# Patient Record
Sex: FEMALE | URBAN - METROPOLITAN AREA
[De-identification: names, ages, dates, MRNs, and addresses within clinical notes are randomized per-mention and may not be internally consistent; named-entity substitution may affect disease eponyms.]

---

## 2023-05-04 ENCOUNTER — OFFICE VISIT (OUTPATIENT)
Facility: LOCATION | Age: 53
End: 2023-05-04
Payer: COMMERCIAL

## 2023-05-04 DIAGNOSIS — H40.013 OPEN ANGLE WITH BORDERLINE FINDINGS, LOW RISK, BILATERAL: Primary | ICD-10-CM

## 2023-05-04 DIAGNOSIS — H04.123 DRY EYE SYNDROME OF BILATERAL LACRIMAL GLANDS: ICD-10-CM

## 2023-05-04 PROCEDURE — 92020 GONIOSCOPY: CPT | Performed by: OPHTHALMOLOGY

## 2023-05-04 PROCEDURE — 99214 OFFICE O/P EST MOD 30 MIN: CPT | Performed by: OPHTHALMOLOGY

## 2023-05-04 PROCEDURE — 92133 CPTRZD OPH DX IMG PST SGM ON: CPT | Performed by: OPHTHALMOLOGY

## 2023-05-04 ASSESSMENT — INTRAOCULAR PRESSURE
OD: 17
OS: 17
OD: 19
OS: 16

## 2023-05-04 NOTE — IMPRESSION/PLAN
Impression: Examination revealed dry eye syndrome secondary to tear deficiencies. (+) Sjogren's Dx 03/2022. H/o Extended duration punctal plugs. Plan: Today:
Discussed with patient blurry VA due to dryness. Informed patient likely to chronic dryness due to Sjogren's. Plan: 
Continue ATs QID OU.

## 2023-05-25 ENCOUNTER — PROCEDURE (OUTPATIENT)
Facility: LOCATION | Age: 53
End: 2023-05-25
Payer: COMMERCIAL

## 2023-05-25 DIAGNOSIS — H40.012 OPEN ANGLE WITH BORDERLINE FINDINGS, LOW RISK, LEFT EYE: ICD-10-CM

## 2023-05-25 DIAGNOSIS — H40.1111 PRIMARY OPEN-ANGLE GLAUCOMA, RIGHT EYE, MILD STAGE: Primary | ICD-10-CM

## 2023-05-25 PROCEDURE — 65855 TRABECULOPLASTY LASER SURG: CPT | Performed by: OPHTHALMOLOGY

## 2023-05-25 ASSESSMENT — INTRAOCULAR PRESSURE
OD: 19
OS: 17

## 2023-05-25 NOTE — IMPRESSION/PLAN
Impression: Patient presents today for SLT OD. POHx: POAG mild OD, GS OS, JENY 2/2 (+) Sjogren's, (-) Ocular/head trauma. FOHx: Positive to glaucoma (Sister). PMHx: HDL, Thyroid problems, Allergies. Eye meds: ATs QID OU. TMAX: Not established. Target IOP: Not established. Plan: Testing:
OCT/ONH 05/2023: OD bdl thin IT, normal GCC, OS bdl thin T, thin IN, normal GCC. OD possible progression, OS stable. Pachy: 502/501. Gonio 5/2023: SS 1+ 360 OU. Previously:
IOP unacceptable OD, acceptable OS. OCT ONH and gonioscopy performed and reviewed. Discussed findings on repeat OCT testing with patient. Informed patient of progression OD. Discussed the need to lower IOP OD. Discussed treatment options including glaucoma gtts vs. SLT. Informed patient of side effects when using drops including increased dryness, redness, and/or blurry VA. Discussed R/B/A/Is of SLT as another treatment if unable to tolerate drops or has poor medication compliance. The risks, benefits, and alternatives were explained, including (but not limited to) the risks of corneal abrasion, inflammation, pain, worsening vision, IOP spike, and possible need for further procedures. Patient expressed understanding and elects to proceed with SLT. Today: SLT OD performed without any complications. Plan:
RTC in 2 weeks with IOP check. RTC in 8 weeks with IOP check.

## 2023-06-08 ENCOUNTER — OFFICE VISIT (OUTPATIENT)
Facility: LOCATION | Age: 53
End: 2023-06-08
Payer: COMMERCIAL

## 2023-06-08 DIAGNOSIS — H40.012 OPEN ANGLE WITH BORDERLINE FINDINGS, LOW RISK, LEFT EYE: ICD-10-CM

## 2023-06-08 DIAGNOSIS — H40.1111 PRIMARY OPEN-ANGLE GLAUCOMA, RIGHT EYE, MILD STAGE: Primary | ICD-10-CM

## 2023-06-08 DIAGNOSIS — H04.123 DRY EYE SYNDROME OF BILATERAL LACRIMAL GLANDS: ICD-10-CM

## 2023-06-08 PROCEDURE — 99213 OFFICE O/P EST LOW 20 MIN: CPT | Performed by: OPHTHALMOLOGY

## 2023-06-08 ASSESSMENT — INTRAOCULAR PRESSURE
OS: 16
OD: 16

## 2023-06-08 NOTE — IMPRESSION/PLAN
Impression: Examination revealed dry eye syndrome secondary to tear deficiencies. (+) Sjogren's Dx 03/2022. H/o Extended duration punctal plugs. Plan: Previous 05/2023:
Discussed with patient blurry VA due to dryness. Informed patient likely to chronic dryness due to Sjogren's. Today:
1+ SPK OU. Exam revealed dryness, recommended continued use AT's regularly. Plan: 
Continue ATs QID OU.

## 2023-06-08 NOTE — IMPRESSION/PLAN
Impression: 2 week s/p SLT OD. Preop 19. POHx: POAG mild OD, GS OS, JENY 2/2 (+) Sjogren's, s/p SLT OD 5/2023, (-) Ocular/head trauma. FOHx: Positive to glaucoma (Sister). PMHx: HDL, Thyroid problems, Allergies. Eye meds: ATs QID OU. TMAX: Not established. Target IOP: Not established. Plan: Testing:
OCT/ONH 05/2023: OD bdl thin IT, normal GCC, OS bdl thin T, thin IN, normal GCC. OD possible progression, OS stable. Pachy: 502/501. Gonio 5/2023: SS 1+ 360 OU. Today:
Preop IOP OD 19.
Early response to SLT. Informed patient no IOP spike and no inflammation. Explained that it takes 8 weeks to see the full effects. Plan:
RTC in 6 weeks with IOP check.

## 2023-07-21 ENCOUNTER — OFFICE VISIT (OUTPATIENT)
Facility: LOCATION | Age: 53
End: 2023-07-21
Payer: COMMERCIAL

## 2023-07-21 DIAGNOSIS — H40.012 OPEN ANGLE WITH BORDERLINE FINDINGS, LOW RISK, LEFT EYE: ICD-10-CM

## 2023-07-21 DIAGNOSIS — H40.1111 PRIMARY OPEN-ANGLE GLAUCOMA, RIGHT EYE, MILD STAGE: Primary | ICD-10-CM

## 2023-07-21 DIAGNOSIS — H04.123 DRY EYE SYNDROME OF BILATERAL LACRIMAL GLANDS: ICD-10-CM

## 2023-07-21 PROCEDURE — 99213 OFFICE O/P EST LOW 20 MIN: CPT | Performed by: OPHTHALMOLOGY

## 2023-07-21 ASSESSMENT — INTRAOCULAR PRESSURE
OS: 19
OD: 15
OS: 14
OD: 19

## 2023-07-21 NOTE — IMPRESSION/PLAN
Impression: Examination revealed dry eye syndrome secondary to tear deficiencies. (+) Sjogren's Dx 03/2022. H/o Extended duration punctal plugs. Plan: Today:
1+ SPK OU. Exam revealed dryness, recommended continued use AT's regularly. Plan: 
Continue ATs QID OU.

## 2023-07-21 NOTE — IMPRESSION/PLAN
Impression: 8 week s/p SLT OD. Preop 19. POHx: POAG mild OD, GS OS, JENY 2/2 (+) Sjogren's, s/p SLT OD 5/2023, (-) Ocular/head trauma. FOHx: Positive to glaucoma (Sister). PMHx: HDL, Thyroid problems, Allergies. Eye meds: ATs QID OU. TMAX: Not established. Target IOP: Not established. Plan: Testing:
OCT/ONH 05/2023: OD bdl thin IT, normal GCC, OS bdl thin T, thin IN, normal GCC. OD possible progression, OS stable. Pachy: 502/501. Gonio 5/2023: SS 1+ 360 OU. Today:
IOP acceptable OU. Very good response to SLT OD 19->15. Plan:
Monitor. RTC in 4 months with OCT/ONH OU.

## 2024-01-25 ENCOUNTER — OFFICE VISIT (OUTPATIENT)
Facility: LOCATION | Age: 54
End: 2024-01-25
Payer: COMMERCIAL

## 2024-01-25 DIAGNOSIS — H40.012 OPEN ANGLE WITH BORDERLINE FINDINGS, LOW RISK, LEFT EYE: ICD-10-CM

## 2024-01-25 DIAGNOSIS — H40.1111 PRIMARY OPEN-ANGLE GLAUCOMA, RIGHT EYE, MILD STAGE: Primary | ICD-10-CM

## 2024-01-25 DIAGNOSIS — H04.123 DRY EYE SYNDROME OF BILATERAL LACRIMAL GLANDS: ICD-10-CM

## 2024-01-25 PROCEDURE — 92133 CPTRZD OPH DX IMG PST SGM ON: CPT | Performed by: OPHTHALMOLOGY

## 2024-01-25 PROCEDURE — 99214 OFFICE O/P EST MOD 30 MIN: CPT | Performed by: OPHTHALMOLOGY

## 2024-01-25 ASSESSMENT — INTRAOCULAR PRESSURE
OD: 14
OD: 15
OS: 16

## 2024-05-02 ENCOUNTER — OFFICE VISIT (OUTPATIENT)
Facility: LOCATION | Age: 54
End: 2024-05-02
Payer: COMMERCIAL

## 2024-05-02 DIAGNOSIS — H40.012 OPEN ANGLE WITH BORDERLINE FINDINGS, LOW RISK, LEFT EYE: ICD-10-CM

## 2024-05-02 DIAGNOSIS — H40.1111 PRIMARY OPEN-ANGLE GLAUCOMA, RIGHT EYE, MILD STAGE: Primary | ICD-10-CM

## 2024-05-02 DIAGNOSIS — H04.123 DRY EYE SYNDROME OF BILATERAL LACRIMAL GLANDS: ICD-10-CM

## 2024-05-02 PROCEDURE — 92133 CPTRZD OPH DX IMG PST SGM ON: CPT | Performed by: OPHTHALMOLOGY

## 2024-05-02 PROCEDURE — 92020 GONIOSCOPY: CPT | Performed by: OPHTHALMOLOGY

## 2024-05-02 PROCEDURE — 99213 OFFICE O/P EST LOW 20 MIN: CPT | Performed by: OPHTHALMOLOGY

## 2024-05-02 ASSESSMENT — INTRAOCULAR PRESSURE
OD: 13
OS: 15
OD: 15

## 2024-11-07 ENCOUNTER — OFFICE VISIT (OUTPATIENT)
Facility: LOCATION | Age: 54
End: 2024-11-07
Payer: COMMERCIAL

## 2024-11-07 DIAGNOSIS — H40.1111 PRIMARY OPEN-ANGLE GLAUCOMA, RIGHT EYE, MILD STAGE: Primary | ICD-10-CM

## 2024-11-07 DIAGNOSIS — H40.012 OPEN ANGLE WITH BORDERLINE FINDINGS, LOW RISK, LEFT EYE: ICD-10-CM

## 2024-11-07 DIAGNOSIS — H04.123 DRY EYE SYNDROME OF BILATERAL LACRIMAL GLANDS: ICD-10-CM

## 2024-11-07 DIAGNOSIS — H16.143 PUNCTATE KERATITIS, BILATERAL: ICD-10-CM

## 2024-11-07 PROCEDURE — 92133 CPTRZD OPH DX IMG PST SGM ON: CPT | Performed by: OPHTHALMOLOGY

## 2024-11-07 PROCEDURE — A4262 TEMPORARY TEAR DUCT PLUG: HCPCS | Performed by: OPHTHALMOLOGY

## 2024-11-07 PROCEDURE — 99214 OFFICE O/P EST MOD 30 MIN: CPT | Performed by: OPHTHALMOLOGY

## 2024-11-07 RX ORDER — CETIRIZINE HYDROCHLORIDE 10 MG/1
10 MG TABLET ORAL AS DIRECTED
Qty: 30 | Refills: 0 | Status: ACTIVE
Start: 2024-11-07

## 2024-11-07 ASSESSMENT — INTRAOCULAR PRESSURE
OD: 18
OS: 18

## 2024-12-12 ENCOUNTER — OFFICE VISIT (OUTPATIENT)
Facility: LOCATION | Age: 54
End: 2024-12-12
Payer: COMMERCIAL

## 2024-12-12 DIAGNOSIS — H04.123 DRY EYE SYNDROME OF BILATERAL LACRIMAL GLANDS: Primary | ICD-10-CM

## 2024-12-12 DIAGNOSIS — H40.1111 PRIMARY OPEN-ANGLE GLAUCOMA, RIGHT EYE, MILD STAGE: ICD-10-CM

## 2024-12-12 DIAGNOSIS — M35.01 SJOGREN SYNDROME WITH KERATOCONJUNCTIVITIS: ICD-10-CM

## 2024-12-12 DIAGNOSIS — H40.012 OPEN ANGLE WITH BORDERLINE FINDINGS, LOW RISK, LEFT EYE: ICD-10-CM

## 2024-12-12 PROCEDURE — 99213 OFFICE O/P EST LOW 20 MIN: CPT | Performed by: OPHTHALMOLOGY

## 2024-12-12 ASSESSMENT — INTRAOCULAR PRESSURE
OD: 14
OS: 14

## 2025-03-06 ENCOUNTER — OFFICE VISIT (OUTPATIENT)
Facility: LOCATION | Age: 55
End: 2025-03-06
Payer: COMMERCIAL

## 2025-03-06 DIAGNOSIS — H04.123 DRY EYE SYNDROME OF BILATERAL LACRIMAL GLANDS: Primary | ICD-10-CM

## 2025-03-06 DIAGNOSIS — H40.1111 PRIMARY OPEN-ANGLE GLAUCOMA, RIGHT EYE, MILD STAGE: ICD-10-CM

## 2025-03-06 DIAGNOSIS — M35.01 SJOGREN SYNDROME WITH KERATOCONJUNCTIVITIS: ICD-10-CM

## 2025-03-06 ASSESSMENT — INTRAOCULAR PRESSURE
OS: 17
OD: 14

## 2025-07-03 ENCOUNTER — OFFICE VISIT (OUTPATIENT)
Facility: LOCATION | Age: 55
End: 2025-07-03
Payer: COMMERCIAL

## 2025-07-03 DIAGNOSIS — H40.1111 PRIMARY OPEN-ANGLE GLAUCOMA, RIGHT EYE, MILD STAGE: ICD-10-CM

## 2025-07-03 DIAGNOSIS — H04.123 DRY EYE SYNDROME OF BILATERAL LACRIMAL GLANDS: Primary | ICD-10-CM

## 2025-07-03 DIAGNOSIS — M35.01 SJOGREN SYNDROME WITH KERATOCONJUNCTIVITIS: ICD-10-CM

## 2025-07-03 ASSESSMENT — INTRAOCULAR PRESSURE
OS: 18
OD: 15